# Patient Record
Sex: MALE | Race: WHITE | Employment: OTHER | URBAN - METROPOLITAN AREA
[De-identification: names, ages, dates, MRNs, and addresses within clinical notes are randomized per-mention and may not be internally consistent; named-entity substitution may affect disease eponyms.]

---

## 2017-05-08 ENCOUNTER — HOSPITAL ENCOUNTER (OUTPATIENT)
Dept: OTHER | Age: 67
Discharge: HOME | End: 2017-05-08
Attending: INTERNAL MEDICINE | Admitting: INTERNAL MEDICINE

## 2017-05-08 LAB
ANION GAP SERPL CALCULATED.3IONS-SCNC: 15 MMOL/L (ref 3–16)
APTT: 27.5 SEC (ref 21–31.8)
BASOPHILS ABSOLUTE: 0.2 K/UL (ref 0–0.2)
BASOPHILS RELATIVE PERCENT: 1.8 %
BILIRUBIN URINE: NEGATIVE
BLOOD, URINE: NEGATIVE
BUN BLDV-MCNC: 13 MG/DL (ref 7–20)
CALCIUM SERPL-MCNC: 9.3 MG/DL (ref 8.3–10.6)
CHLORIDE BLD-SCNC: 102 MMOL/L (ref 99–110)
CLARITY: CLEAR
CO2: 26 MMOL/L (ref 21–32)
COLOR: YELLOW
CREAT SERPL-MCNC: 0.9 MG/DL (ref 0.8–1.3)
EOSINOPHILS ABSOLUTE: 0.3 K/UL (ref 0–0.6)
EOSINOPHILS RELATIVE PERCENT: 2.8 %
GFR AFRICAN AMERICAN: >60
GFR NON-AFRICAN AMERICAN: >60
GLUCOSE BLD-MCNC: 85 MG/DL (ref 70–99)
GLUCOSE URINE: NEGATIVE MG/DL
HCT VFR BLD CALC: 44.7 % (ref 40.5–52.5)
HEMOGLOBIN: 14.8 G/DL (ref 13.5–17.5)
INR BLD: 1.04 (ref 0.85–1.15)
KETONES, URINE: NEGATIVE MG/DL
LEUKOCYTE ESTERASE, URINE: NEGATIVE
LYMPHOCYTES ABSOLUTE: 1.5 K/UL (ref 1–5.1)
LYMPHOCYTES RELATIVE PERCENT: 15.6 %
MCH RBC QN AUTO: 31.9 PG (ref 26–34)
MCHC RBC AUTO-ENTMCNC: 33.1 G/DL (ref 31–36)
MCV RBC AUTO: 96.5 FL (ref 80–100)
MICROSCOPIC EXAMINATION: NORMAL
MONOCYTES ABSOLUTE: 0.7 K/UL (ref 0–1.3)
MONOCYTES RELATIVE PERCENT: 7.7 %
NEUTROPHILS ABSOLUTE: 6.8 K/UL (ref 1.7–7.7)
NEUTROPHILS RELATIVE PERCENT: 72.1 %
NITRITE, URINE: NEGATIVE
PDW BLD-RTO: 13.5 % (ref 12.4–15.4)
PH UA: 6.5
PLATELET # BLD: 308 K/UL (ref 135–450)
PMV BLD AUTO: 8.4 FL (ref 5–10.5)
POTASSIUM SERPL-SCNC: 4.2 MMOL/L (ref 3.5–5.1)
PROTEIN UA: NEGATIVE MG/DL
PROTHROMBIN TIME: 11.7 SEC (ref 9.6–13)
RBC # BLD: 4.63 M/UL (ref 4.2–5.9)
SODIUM BLD-SCNC: 143 MMOL/L (ref 136–145)
SPECIFIC GRAVITY UA: 1.01
URINE TYPE: NORMAL
UROBILINOGEN, URINE: 0.2 E.U./DL
WBC # BLD: 9.4 K/UL (ref 4–11)

## 2020-01-16 ENCOUNTER — HOSPITAL ENCOUNTER (OUTPATIENT)
Age: 70
Discharge: HOME | End: 2020-01-16
Payer: MEDICARE

## 2020-01-16 LAB
ANION GAP SERPL CALCULATED.3IONS-SCNC: 14 MMOL/L (ref 3–16)
APTT: 29.9 SEC (ref 24.2–36.2)
BASOPHILS ABSOLUTE: 0.1 K/UL (ref 0–0.2)
BASOPHILS RELATIVE PERCENT: 0.7 %
BILIRUBIN URINE: NEGATIVE
BLOOD, URINE: NEGATIVE
BUN BLDV-MCNC: 16 MG/DL (ref 7–20)
CALCIUM SERPL-MCNC: 9.4 MG/DL (ref 8.3–10.6)
CHLORIDE BLD-SCNC: 102 MMOL/L (ref 99–110)
CLARITY: CLEAR
CO2: 27 MMOL/L (ref 21–32)
COLOR: YELLOW
CREAT SERPL-MCNC: 1 MG/DL (ref 0.8–1.3)
EOSINOPHILS ABSOLUTE: 0.2 K/UL (ref 0–0.6)
EOSINOPHILS RELATIVE PERCENT: 2.5 %
EPITHELIAL CELLS, UA: 0 /HPF (ref 0–5)
GFR AFRICAN AMERICAN: >60
GFR NON-AFRICAN AMERICAN: >60
GLUCOSE BLD-MCNC: 81 MG/DL (ref 70–99)
GLUCOSE URINE: NEGATIVE MG/DL
HCT VFR BLD CALC: 44.4 % (ref 40.5–52.5)
HEMOGLOBIN: 14.7 G/DL (ref 13.5–17.5)
HYALINE CASTS: 0 /LPF (ref 0–8)
INR BLD: 1.08 (ref 0.86–1.14)
KETONES, URINE: NEGATIVE MG/DL
LEUKOCYTE ESTERASE, URINE: NEGATIVE
LYMPHOCYTES ABSOLUTE: 1.5 K/UL (ref 1–5.1)
LYMPHOCYTES RELATIVE PERCENT: 18.9 %
MCH RBC QN AUTO: 31.9 PG (ref 26–34)
MCHC RBC AUTO-ENTMCNC: 33.1 G/DL (ref 31–36)
MCV RBC AUTO: 96.2 FL (ref 80–100)
MICROSCOPIC EXAMINATION: NORMAL
MONOCYTES ABSOLUTE: 0.8 K/UL (ref 0–1.3)
MONOCYTES RELATIVE PERCENT: 10.5 %
NEUTROPHILS ABSOLUTE: 5.4 K/UL (ref 1.7–7.7)
NEUTROPHILS RELATIVE PERCENT: 67.4 %
NITRITE, URINE: NEGATIVE
PDW BLD-RTO: 13.3 % (ref 12.4–15.4)
PH UA: 6 (ref 5–8)
PLATELET # BLD: 325 K/UL (ref 135–450)
PMV BLD AUTO: 8.3 FL (ref 5–10.5)
POTASSIUM SERPL-SCNC: 4.3 MMOL/L (ref 3.5–5.1)
PROTEIN UA: NEGATIVE MG/DL
PROTHROMBIN TIME: 12.5 SEC (ref 10–13.2)
RBC # BLD: 4.61 M/UL (ref 4.2–5.9)
RBC UA: 2 /HPF (ref 0–4)
SODIUM BLD-SCNC: 143 MMOL/L (ref 136–145)
SPECIFIC GRAVITY UA: 1.02 (ref 1–1.03)
URINE TYPE: NORMAL
UROBILINOGEN, URINE: 0.2 E.U./DL
WBC # BLD: 8 K/UL (ref 4–11)
WBC UA: 1 /HPF (ref 0–5)

## 2020-01-16 PROCEDURE — 36415 COLL VENOUS BLD VENIPUNCTURE: CPT

## 2020-01-16 PROCEDURE — 85730 THROMBOPLASTIN TIME PARTIAL: CPT

## 2020-01-16 PROCEDURE — 85610 PROTHROMBIN TIME: CPT

## 2020-01-16 PROCEDURE — 81001 URINALYSIS AUTO W/SCOPE: CPT

## 2020-01-16 PROCEDURE — 93005 ELECTROCARDIOGRAM TRACING: CPT | Performed by: INTERNAL MEDICINE

## 2020-01-16 PROCEDURE — 80048 BASIC METABOLIC PNL TOTAL CA: CPT

## 2020-01-16 PROCEDURE — 85025 COMPLETE CBC W/AUTO DIFF WBC: CPT

## 2020-01-17 LAB
EKG ATRIAL RATE: 59 BPM
EKG DIAGNOSIS: NORMAL
EKG P AXIS: 77 DEGREES
EKG P-R INTERVAL: 162 MS
EKG Q-T INTERVAL: 424 MS
EKG QRS DURATION: 82 MS
EKG QTC CALCULATION (BAZETT): 419 MS
EKG R AXIS: 61 DEGREES
EKG T AXIS: 11 DEGREES
EKG VENTRICULAR RATE: 59 BPM

## 2020-01-17 PROCEDURE — 93010 ELECTROCARDIOGRAM REPORT: CPT | Performed by: INTERNAL MEDICINE

## 2021-05-14 ENCOUNTER — HOSPITAL ENCOUNTER (OUTPATIENT)
Age: 71
Discharge: HOME | End: 2021-05-14
Payer: MEDICARE

## 2021-05-14 LAB
A/G RATIO: 1.6 (ref 1.1–2.2)
ALBUMIN SERPL-MCNC: 4.6 G/DL (ref 3.4–5)
ALP BLD-CCNC: 88 U/L (ref 40–129)
ALT SERPL-CCNC: 25 U/L (ref 10–40)
ANION GAP SERPL CALCULATED.3IONS-SCNC: 13 MMOL/L (ref 3–16)
AST SERPL-CCNC: 33 U/L (ref 15–37)
BASOPHILS ABSOLUTE: 0.1 K/UL (ref 0–0.2)
BASOPHILS RELATIVE PERCENT: 1.1 %
BILIRUB SERPL-MCNC: 0.8 MG/DL (ref 0–1)
BUN BLDV-MCNC: 16 MG/DL (ref 7–20)
CALCIUM SERPL-MCNC: 9.8 MG/DL (ref 8.3–10.6)
CHLORIDE BLD-SCNC: 107 MMOL/L (ref 99–110)
CO2: 24 MMOL/L (ref 21–32)
CREAT SERPL-MCNC: 1.3 MG/DL (ref 0.8–1.3)
EOSINOPHILS ABSOLUTE: 0.4 K/UL (ref 0–0.6)
EOSINOPHILS RELATIVE PERCENT: 4.5 %
GFR AFRICAN AMERICAN: >60
GFR NON-AFRICAN AMERICAN: 54
GLOBULIN: 2.9 G/DL
GLUCOSE BLD-MCNC: 69 MG/DL (ref 70–99)
HCT VFR BLD CALC: 42.3 % (ref 40.5–52.5)
HEMOGLOBIN: 14.4 G/DL (ref 13.5–17.5)
LYMPHOCYTES ABSOLUTE: 1.6 K/UL (ref 1–5.1)
LYMPHOCYTES RELATIVE PERCENT: 20.5 %
MCH RBC QN AUTO: 32.2 PG (ref 26–34)
MCHC RBC AUTO-ENTMCNC: 33.9 G/DL (ref 31–36)
MCV RBC AUTO: 94.9 FL (ref 80–100)
MONOCYTES ABSOLUTE: 0.9 K/UL (ref 0–1.3)
MONOCYTES RELATIVE PERCENT: 11.4 %
NEUTROPHILS ABSOLUTE: 5 K/UL (ref 1.7–7.7)
NEUTROPHILS RELATIVE PERCENT: 62.5 %
PDW BLD-RTO: 13.9 % (ref 12.4–15.4)
PLATELET # BLD: 327 K/UL (ref 135–450)
PMV BLD AUTO: 7.6 FL (ref 5–10.5)
POTASSIUM SERPL-SCNC: 5.1 MMOL/L (ref 3.5–5.1)
RBC # BLD: 4.46 M/UL (ref 4.2–5.9)
SODIUM BLD-SCNC: 144 MMOL/L (ref 136–145)
TOTAL PROTEIN: 7.5 G/DL (ref 6.4–8.2)
WBC # BLD: 7.9 K/UL (ref 4–11)

## 2021-05-14 PROCEDURE — 85025 COMPLETE CBC W/AUTO DIFF WBC: CPT

## 2021-05-14 PROCEDURE — 36415 COLL VENOUS BLD VENIPUNCTURE: CPT

## 2021-05-14 PROCEDURE — 83036 HEMOGLOBIN GLYCOSYLATED A1C: CPT

## 2021-05-14 PROCEDURE — 80053 COMPREHEN METABOLIC PANEL: CPT

## 2021-05-15 LAB
ESTIMATED AVERAGE GLUCOSE: 99.7 MG/DL
HBA1C MFR BLD: 5.1 %

## 2021-07-15 ENCOUNTER — CLINICAL DOCUMENTATION (OUTPATIENT)
Dept: OTHER | Age: 71
End: 2021-07-15

## 2024-07-05 ENCOUNTER — APPOINTMENT (OUTPATIENT)
Dept: GENERAL RADIOLOGY | Age: 74
End: 2024-07-05
Payer: MEDICARE

## 2024-07-05 ENCOUNTER — HOSPITAL ENCOUNTER (OUTPATIENT)
Age: 74
Setting detail: OBSERVATION
Discharge: HOME | End: 2024-07-06
Attending: STUDENT IN AN ORGANIZED HEALTH CARE EDUCATION/TRAINING PROGRAM | Admitting: STUDENT IN AN ORGANIZED HEALTH CARE EDUCATION/TRAINING PROGRAM
Payer: MEDICARE

## 2024-07-05 DIAGNOSIS — I20.9 ANGINA PECTORIS (HCC): ICD-10-CM

## 2024-07-05 DIAGNOSIS — R07.9 CHEST PAIN, UNSPECIFIED TYPE: ICD-10-CM

## 2024-07-05 DIAGNOSIS — R94.31 ABNORMAL ELECTROCARDIOGRAPHY: ICD-10-CM

## 2024-07-05 DIAGNOSIS — R07.89 CHEST DISCOMFORT: Primary | ICD-10-CM

## 2024-07-05 LAB
ALBUMIN SERPL-MCNC: 4.4 G/DL (ref 3.4–5)
ALBUMIN/GLOB SERPL: 1.3 {RATIO} (ref 1.1–2.2)
ALP SERPL-CCNC: 111 U/L (ref 40–129)
ALT SERPL-CCNC: 23 U/L (ref 10–40)
ANION GAP SERPL CALCULATED.3IONS-SCNC: 11 MMOL/L (ref 3–16)
AST SERPL-CCNC: 31 U/L (ref 15–37)
BASOPHILS # BLD: 0 K/UL (ref 0–0.2)
BASOPHILS NFR BLD: 0.4 %
BILIRUB SERPL-MCNC: 0.7 MG/DL (ref 0–1)
BUN SERPL-MCNC: 13 MG/DL (ref 7–20)
CALCIUM SERPL-MCNC: 9.1 MG/DL (ref 8.3–10.6)
CHLORIDE SERPL-SCNC: 102 MMOL/L (ref 99–110)
CO2 SERPL-SCNC: 25 MMOL/L (ref 21–32)
CREAT SERPL-MCNC: 1.1 MG/DL (ref 0.8–1.3)
D-DIMER QUANTITATIVE: 0.6 UG/ML FEU (ref 0–0.6)
DEPRECATED RDW RBC AUTO: 13.5 % (ref 12.4–15.4)
EKG ATRIAL RATE: 70 BPM
EKG DIAGNOSIS: NORMAL
EKG P AXIS: 71 DEGREES
EKG P-R INTERVAL: 154 MS
EKG Q-T INTERVAL: 394 MS
EKG QRS DURATION: 78 MS
EKG QTC CALCULATION (BAZETT): 425 MS
EKG R AXIS: 51 DEGREES
EKG T AXIS: 0 DEGREES
EKG VENTRICULAR RATE: 70 BPM
EOSINOPHIL # BLD: 0 K/UL (ref 0–0.6)
EOSINOPHIL NFR BLD: 0.5 %
GFR SERPLBLD CREATININE-BSD FMLA CKD-EPI: 70 ML/MIN/{1.73_M2}
GLUCOSE SERPL-MCNC: 111 MG/DL (ref 70–99)
HCT VFR BLD AUTO: 43.6 % (ref 40.5–52.5)
HGB BLD-MCNC: 14.8 G/DL (ref 13.5–17.5)
LYMPHOCYTES # BLD: 0.6 K/UL (ref 1–5.1)
LYMPHOCYTES NFR BLD: 9.4 %
MAGNESIUM SERPL-MCNC: 1.9 MG/DL (ref 1.8–2.4)
MCH RBC QN AUTO: 31.9 PG (ref 26–34)
MCHC RBC AUTO-ENTMCNC: 33.9 G/DL (ref 31–36)
MCV RBC AUTO: 94 FL (ref 80–100)
MONOCYTES # BLD: 1 K/UL (ref 0–1.3)
MONOCYTES NFR BLD: 14.6 %
NEUTROPHILS # BLD: 5.1 K/UL (ref 1.7–7.7)
NEUTROPHILS NFR BLD: 75.1 %
NT-PROBNP SERPL-MCNC: 284 PG/ML (ref 0–449)
PLATELET # BLD AUTO: 274 K/UL (ref 135–450)
PMV BLD AUTO: 7.3 FL (ref 5–10.5)
POTASSIUM SERPL-SCNC: 4.4 MMOL/L (ref 3.5–5.1)
PROT SERPL-MCNC: 7.8 G/DL (ref 6.4–8.2)
RBC # BLD AUTO: 4.64 M/UL (ref 4.2–5.9)
SODIUM SERPL-SCNC: 138 MMOL/L (ref 136–145)
TROPONIN, HIGH SENSITIVITY: 19 NG/L (ref 0–22)
TROPONIN, HIGH SENSITIVITY: 19 NG/L (ref 0–22)
TSH SERPL DL<=0.005 MIU/L-ACNC: 1.98 UIU/ML (ref 0.27–4.2)
WBC # BLD AUTO: 6.8 K/UL (ref 4–11)

## 2024-07-05 PROCEDURE — 80053 COMPREHEN METABOLIC PANEL: CPT

## 2024-07-05 PROCEDURE — 6370000000 HC RX 637 (ALT 250 FOR IP): Performed by: STUDENT IN AN ORGANIZED HEALTH CARE EDUCATION/TRAINING PROGRAM

## 2024-07-05 PROCEDURE — G0378 HOSPITAL OBSERVATION PER HR: HCPCS

## 2024-07-05 PROCEDURE — 6370000000 HC RX 637 (ALT 250 FOR IP): Performed by: INTERNAL MEDICINE

## 2024-07-05 PROCEDURE — 85379 FIBRIN DEGRADATION QUANT: CPT

## 2024-07-05 PROCEDURE — 99285 EMERGENCY DEPT VISIT HI MDM: CPT

## 2024-07-05 PROCEDURE — 85025 COMPLETE CBC W/AUTO DIFF WBC: CPT

## 2024-07-05 PROCEDURE — 71045 X-RAY EXAM CHEST 1 VIEW: CPT

## 2024-07-05 PROCEDURE — 83735 ASSAY OF MAGNESIUM: CPT

## 2024-07-05 PROCEDURE — 93010 ELECTROCARDIOGRAM REPORT: CPT | Performed by: INTERNAL MEDICINE

## 2024-07-05 PROCEDURE — 84484 ASSAY OF TROPONIN QUANT: CPT

## 2024-07-05 PROCEDURE — 6370000000 HC RX 637 (ALT 250 FOR IP): Performed by: PHYSICIAN ASSISTANT

## 2024-07-05 PROCEDURE — 99223 1ST HOSP IP/OBS HIGH 75: CPT | Performed by: INTERNAL MEDICINE

## 2024-07-05 PROCEDURE — 93005 ELECTROCARDIOGRAM TRACING: CPT | Performed by: STUDENT IN AN ORGANIZED HEALTH CARE EDUCATION/TRAINING PROGRAM

## 2024-07-05 PROCEDURE — 84443 ASSAY THYROID STIM HORMONE: CPT

## 2024-07-05 PROCEDURE — 83880 ASSAY OF NATRIURETIC PEPTIDE: CPT

## 2024-07-05 PROCEDURE — 2580000003 HC RX 258: Performed by: STUDENT IN AN ORGANIZED HEALTH CARE EDUCATION/TRAINING PROGRAM

## 2024-07-05 RX ORDER — MAGNESIUM SULFATE IN WATER 40 MG/ML
2000 INJECTION, SOLUTION INTRAVENOUS PRN
Status: DISCONTINUED | OUTPATIENT
Start: 2024-07-05 | End: 2024-07-06 | Stop reason: HOSPADM

## 2024-07-05 RX ORDER — DILTIAZEM HYDROCHLORIDE 120 MG/1
120 CAPSULE, COATED, EXTENDED RELEASE ORAL DAILY
Status: DISCONTINUED | OUTPATIENT
Start: 2024-07-05 | End: 2024-07-06 | Stop reason: HOSPADM

## 2024-07-05 RX ORDER — SODIUM CHLORIDE 0.9 % (FLUSH) 0.9 %
5-40 SYRINGE (ML) INJECTION EVERY 12 HOURS SCHEDULED
Status: DISCONTINUED | OUTPATIENT
Start: 2024-07-05 | End: 2024-07-06 | Stop reason: HOSPADM

## 2024-07-05 RX ORDER — POTASSIUM CHLORIDE 7.45 MG/ML
10 INJECTION INTRAVENOUS PRN
Status: DISCONTINUED | OUTPATIENT
Start: 2024-07-05 | End: 2024-07-06 | Stop reason: HOSPADM

## 2024-07-05 RX ORDER — SODIUM CHLORIDE 0.9 % (FLUSH) 0.9 %
5-40 SYRINGE (ML) INJECTION PRN
Status: DISCONTINUED | OUTPATIENT
Start: 2024-07-05 | End: 2024-07-06 | Stop reason: HOSPADM

## 2024-07-05 RX ORDER — REGADENOSON 0.08 MG/ML
0.4 INJECTION, SOLUTION INTRAVENOUS
Status: CANCELLED | OUTPATIENT
Start: 2024-07-05

## 2024-07-05 RX ORDER — ACETAMINOPHEN 325 MG/1
650 TABLET ORAL EVERY 6 HOURS PRN
Status: DISCONTINUED | OUTPATIENT
Start: 2024-07-05 | End: 2024-07-06 | Stop reason: HOSPADM

## 2024-07-05 RX ORDER — ASPIRIN 81 MG/1
324 TABLET, CHEWABLE ORAL ONCE
Status: COMPLETED | OUTPATIENT
Start: 2024-07-05 | End: 2024-07-05

## 2024-07-05 RX ORDER — ACETAMINOPHEN 650 MG/1
650 SUPPOSITORY RECTAL EVERY 6 HOURS PRN
Status: DISCONTINUED | OUTPATIENT
Start: 2024-07-05 | End: 2024-07-06 | Stop reason: HOSPADM

## 2024-07-05 RX ORDER — SODIUM CHLORIDE 9 MG/ML
INJECTION, SOLUTION INTRAVENOUS PRN
Status: DISCONTINUED | OUTPATIENT
Start: 2024-07-05 | End: 2024-07-06 | Stop reason: HOSPADM

## 2024-07-05 RX ORDER — ONDANSETRON 2 MG/ML
4 INJECTION INTRAMUSCULAR; INTRAVENOUS EVERY 6 HOURS PRN
Status: DISCONTINUED | OUTPATIENT
Start: 2024-07-05 | End: 2024-07-06 | Stop reason: HOSPADM

## 2024-07-05 RX ORDER — POTASSIUM CHLORIDE 20 MEQ/1
40 TABLET, EXTENDED RELEASE ORAL PRN
Status: DISCONTINUED | OUTPATIENT
Start: 2024-07-05 | End: 2024-07-06 | Stop reason: HOSPADM

## 2024-07-05 RX ORDER — DILTIAZEM HYDROCHLORIDE 120 MG/1
120 CAPSULE, COATED, EXTENDED RELEASE ORAL DAILY
Status: DISCONTINUED | OUTPATIENT
Start: 2024-07-05 | End: 2024-07-05

## 2024-07-05 RX ORDER — POLYETHYLENE GLYCOL 3350 17 G/17G
17 POWDER, FOR SOLUTION ORAL DAILY PRN
Status: DISCONTINUED | OUTPATIENT
Start: 2024-07-05 | End: 2024-07-06 | Stop reason: HOSPADM

## 2024-07-05 RX ORDER — ENOXAPARIN SODIUM 100 MG/ML
40 INJECTION SUBCUTANEOUS DAILY
Status: DISCONTINUED | OUTPATIENT
Start: 2024-07-05 | End: 2024-07-06 | Stop reason: HOSPADM

## 2024-07-05 RX ORDER — ONDANSETRON 4 MG/1
4 TABLET, ORALLY DISINTEGRATING ORAL EVERY 8 HOURS PRN
Status: DISCONTINUED | OUTPATIENT
Start: 2024-07-05 | End: 2024-07-06 | Stop reason: HOSPADM

## 2024-07-05 RX ORDER — LANOLIN ALCOHOL/MO/W.PET/CERES
6 CREAM (GRAM) TOPICAL NIGHTLY PRN
Status: DISCONTINUED | OUTPATIENT
Start: 2024-07-05 | End: 2024-07-06 | Stop reason: HOSPADM

## 2024-07-05 RX ORDER — NITROGLYCERIN 0.4 MG/1
0.4 TABLET SUBLINGUAL EVERY 5 MIN PRN
Status: DISCONTINUED | OUTPATIENT
Start: 2024-07-05 | End: 2024-07-06 | Stop reason: HOSPADM

## 2024-07-05 RX ADMIN — ASPIRIN 81 MG 324 MG: 81 TABLET ORAL at 11:00

## 2024-07-05 RX ADMIN — DILTIAZEM HYDROCHLORIDE 120 MG: 120 CAPSULE, EXTENDED RELEASE ORAL at 17:32

## 2024-07-05 RX ADMIN — SODIUM CHLORIDE, PRESERVATIVE FREE 10 ML: 5 INJECTION INTRAVENOUS at 20:59

## 2024-07-05 RX ADMIN — ACETAMINOPHEN 650 MG: 325 TABLET ORAL at 20:58

## 2024-07-05 RX ADMIN — NITROGLYCERIN 0.5 INCH: 20 OINTMENT TOPICAL at 11:01

## 2024-07-05 ASSESSMENT — ENCOUNTER SYMPTOMS
DIARRHEA: 0
ABDOMINAL PAIN: 0
BACK PAIN: 0
COLOR CHANGE: 0
NAUSEA: 1
CONSTIPATION: 0
SHORTNESS OF BREATH: 0
CHEST TIGHTNESS: 1
VOMITING: 0
COUGH: 0

## 2024-07-05 ASSESSMENT — PAIN DESCRIPTION - LOCATION
LOCATION: GENERALIZED;HEAD
LOCATION: CHEST
LOCATION: GENERALIZED;HEAD
LOCATION: CHEST

## 2024-07-05 ASSESSMENT — PAIN SCALES - GENERAL
PAINLEVEL_OUTOF10: 5
PAINLEVEL_OUTOF10: 1
PAINLEVEL_OUTOF10: 1
PAINLEVEL_OUTOF10: 5
PAINLEVEL_OUTOF10: 0
PAINLEVEL_OUTOF10: 1

## 2024-07-05 ASSESSMENT — PAIN DESCRIPTION - ORIENTATION: ORIENTATION: RIGHT;LEFT;ANTERIOR

## 2024-07-05 ASSESSMENT — HEART SCORE: ECG: NON-SPECIFC REPOLARIZATION DISTURBANCE/LBTB/PM

## 2024-07-05 ASSESSMENT — PAIN DESCRIPTION - DESCRIPTORS
DESCRIPTORS: ACHING
DESCRIPTORS: ACHING
DESCRIPTORS: SQUEEZING

## 2024-07-05 ASSESSMENT — LIFESTYLE VARIABLES
HOW OFTEN DO YOU HAVE A DRINK CONTAINING ALCOHOL: NEVER
HOW MANY STANDARD DRINKS CONTAINING ALCOHOL DO YOU HAVE ON A TYPICAL DAY: PATIENT DOES NOT DRINK

## 2024-07-05 ASSESSMENT — PAIN - FUNCTIONAL ASSESSMENT: PAIN_FUNCTIONAL_ASSESSMENT: 0-10

## 2024-07-05 NOTE — CONSULTS
[facility], [facility]   Electrophysiology   Date: 7/5/2024  Reason for Consultation: Tachycardia   Consult Requesting Physician: Campos Pablo MD     Chief Complaint   Patient presents with    Chest Pain     Patient arrives through triage with complaints of chest pain. Substernal chest pain that radiates to left side and into the shoulder. Patient denies hx of heart problems. Patient reports nausea and feeling unsteady on his feet as well. Unsteadiness has been ongoing for weeks.        CC: Chest pain  HPI: Jefe Loomis is a 74 y.o. male with past medical history arthritis, bilateral hip and knee replacement, hernia repair     He has no prior history of cardiac issues. States that he started noticing intermittent chest pain for the past three weeks.   Patient complained of left-sided chest pain that radiates into his left shoulder he states ongoing for the past several days.  Patient describes pain as a aching discomfort.  No shortness of breath. Denies aggravating or relieving factors.  Denies palpitations, lightheadedness, dizziness, cough, sick contacts, change in urinary or bowel habits and/or recent travel.  Denies current tobacco use, prior smoker in 1981. Does report family history of heart disease. States he is active at home, lift weights regularly.   He has arthritis and has had multiple knee and hip surgery.     On telemetry he was noted to have runs of non-sustained SVT and EP has been consulted.     Assessment:   Chest discomfort   Paroxysmal atrial tachycardia     Plan:   Patient has had runs of non-sustained narrow complex tachycardia, likely PAT.   It is not clear whether his chest pain are related to these episodes.   He hasn't seen a physician for years.     Discussed diagnostic options including stress testing. Risks, benefits and alternative were explained. All questions answered. Patient understood and would like to proceed.      Echocardiogram  Stress test to r/o ischemic

## 2024-07-05 NOTE — ED PROVIDER NOTES
[facility] EMERGENCY DEPARTMENT  EMERGENCY DEPARTMENT ENCOUNTER        Pt Name: Jefe Loomis  MRN: [id number]  Birthdate 1950  Date of evaluation: 7/5/2024  Provider: ZORAN Suresh  PCP: Esteban Emerson MD  Note Started: 1:10 PM EDT 7/5/24      BILL. I have evaluated this patient.        CHIEF COMPLAINT       Chief Complaint   Patient presents with    Chest Pain     Patient arrives through triage with complaints of chest pain. Substernal chest pain that radiates to left side and into the shoulder. Patient denies hx of heart problems. Patient reports nausea and feeling unsteady on his feet as well. Unsteadiness has been ongoing for weeks.        HISTORY OF PRESENT ILLNESS: 1 or more Elements     History From: Patient  Limitations to history : None    Jefe Loomis is a 74 y.o. male with no significant past medical history who presents ED with complaint of chest pain.  Patient complained of left-sided chest pain that radiates into his left shoulder he states ongoing for the past several days.  Patient describes pain as a aching discomfort rated 1/10.  Denies aggravating relieving factors.  He denies shortness of breath but states pain does endorse when he takes a deep breath.  Denies cough or hemoptysis.  Denies headache, lightheadedness/dizziness, syncope or near syncope.  Denies diaphoresis.  Reports some nausea.  Denies abdominal pain, vomiting, urinary symptoms or changes in bowel movements.  Denies history of heart disease.  Denies recent travel, trips, surgery or immobilization.  He denies history of DVT or PE.  Has never had cardiac stress test or catheterization in the past.  He states he is not a smoker.  Does report family history of heart disease.    Nursing Notes were all reviewed and agreed with or any disagreements were addressed in the HPI.    REVIEW OF SYSTEMS :      Review of Systems   Constitutional:  Negative for activity change, appetite change, chills,

## 2024-07-05 NOTE — ED NOTES
ED TO INPATIENT SBAR HANDOFF    Patient Name: Jefe Loomis   :  1950  74 y.o.   MRN:  [id number]  Preferred Name  Jefe  ED Room #:  ED-0024/24  Family/Caregiver Present yes   Restraints no   Sitter no   Sepsis Risk Score      Situation  Code Status: No Order No additional code details.    Allergies: Patient has no known allergies.  Weight: Patient Vitals for the past 96 hrs (Last 3 readings):   Weight   24 0949 87.5 kg (193 lb)     Arrived from: home  Chief Complaint:   Chief Complaint   Patient presents with    Chest Pain     Patient arrives through triage with complaints of chest pain. Substernal chest pain that radiates to left side and into the shoulder. Patient denies hx of heart problems. Patient reports nausea and feeling unsteady on his feet as well. Unsteadiness has been ongoing for weeks.      Hospital Problem/Diagnosis:  Active Problems:    * No active hospital problems. *  Resolved Problems:    * No resolved hospital problems. *    Imaging:   XR CHEST PORTABLE   Final Result   1.  No acute abnormality.           Abnormal labs:   Abnormal Labs Reviewed   CBC WITH AUTO DIFFERENTIAL - Abnormal; Notable for the following components:       Result Value    Lymphocytes Absolute 0.6 (*)     All other components within normal limits   COMPREHENSIVE METABOLIC PANEL W/ REFLEX TO MG FOR LOW K - Abnormal; Notable for the following components:    Glucose 111 (*)     All other components within normal limits     Critical values: no     Abnormal Assessment Findings: Chest pain with normal trop.    Background  History: No past medical history on file.    Assessment    Vitals/MEWS: MEWS Score: 1  Level of Consciousness: Alert (0)   Vitals:    24 1215 24 1230 24 1245 24 1300   BP: 103/61 118/84 129/82 122/74   Pulse: 84 69 93 74   Resp: 20 18 11 15   Temp:       TempSrc:       SpO2: 94% 93% 95% 93%   Weight:       Height:         FiO2 (%): none.  O2 Flow Rate:      Cardiac Rhythm:

## 2024-07-05 NOTE — ED NOTES
How does patient ambulate?   [x]Low Fall Risk (ambulates by themselves without support)  []Stand by assist   []Contact Guard   []Front wheel walker  []Wheelchair   []Steady  []Bed bound  []History of Lower Extremity Amputation  []Unknown, did not assess in the emergency department   How does patient take pills?  [x]Whole with Water  []Crushed in applesauce  []Crushed in pudding  []Other  []Unknown no oral medications were given in the ED  Is patient alert?   [x]Alert  []Drowsy but responds to voice  []Doesn't respond to voice but responds to painful stimuli  []Unresponsive  Is patient oriented?   [x]To person  [x]To place  [x]To time  [x]To situation  []Confused  []Agitated  [x]Follows commands  If patient is disoriented or from a Skill Nursing Facility has family been notified of admission?   [x]Yes   []No  Patient belongings?   [x]Cell phone  [x]Wallet   []Dentures  [x]Clothing  Any specific patient or family belongings/needs/dynamics?   None.  Miscellaneous comments/pending orders?  None.     If there are any additional questions please reach out to the Emergency Department.

## 2024-07-05 NOTE — ED NOTES
Attending hospitalist notified that Pt had 2 rounds of atrial tachycardia for around 25 beats.  Pt placed on the defibrillator pads as a precaution before transfer to the floor.

## 2024-07-05 NOTE — PROGRESS NOTES
Patient has arrived to unit. Vitals stable. Patient is awake, alert and oriented. Respirations are easy and unlabored. Patient does not appear to be in distress. Patient oriented to room and call light. Plan of care discussed with patient, patient agreeable. Call light within reach. Patient noted in the ED to have some irregular HR, cardiology consulted

## 2024-07-05 NOTE — H&P
HOSPITALISTS HISTORY AND PHYSICAL    2024 1:44 PM    Patient Information:  SHELLEY BLAKE is a 74 y.o. male [id number]  PCP:  Esteban Emerson MD (Tel: None )    Chief complaint:    Chief Complaint   Patient presents with    Chest Pain     Patient arrives through triage with complaints of chest pain. Substernal chest pain that radiates to left side and into the shoulder. Patient denies hx of heart problems. Patient reports nausea and feeling unsteady on his feet as well. Unsteadiness has been ongoing for weeks.     cp    History of Present Illness:  Shelley Blake is a 74 y.o. male  w no sig PMH who presented with lt chest pain. Describes as  lower chest  ,close to nipple area  , sharp twinges, lasting few seconds. Not a.w activity, actually doesn't feel which while active but more at rest.  Been going on for 2-3 months. No n/v , diaphoresis with episode.  Mother had cad and  in 80;s. Stopped smoking 40 yrs ago.     Vitals wnl  Labs d dimer , trop x2 wnl.  EKG TWI inf leads, similar to prior  Cxr no abnormality         REVIEW OF SYSTEMS:   All 10 systems reviewed and  are negative except as mentioned in HPI    Past Medical History:   has no past medical history on file.     Past Surgical History:   has no past surgical history on file.     Medications:  No current facility-administered medications on file prior to encounter.     No current outpatient medications on file prior to encounter.       Allergies:  No Known Allergies     Social History:  Patient Lives    reports that he has never smoked. He has never used smokeless tobacco. He reports that he does not drink alcohol and does not use drugs.     Family History:  family history is not on file. ,   Physical Exam:  /81   Pulse 69   Temp 98.3 °F (36.8 °C) (Oral)   Resp 10   Ht 1.803 m (5' 11\")   Wt 87.5 kg (193 lb)   SpO2 94%   BMI

## 2024-07-05 NOTE — CARE COORDINATION
SW reviewed pt's chart for discharge planning purposes.  Patient is from home w/spouse.  He is active with insurance and PCP.   Pt being admitted under observation anticipating a short stay at the hospital.  No discharge needs have been identified at this time.  SW will continue to follow during admission.

## 2024-07-06 ENCOUNTER — HOSPITAL ENCOUNTER (OUTPATIENT)
Age: 74
Setting detail: OBSERVATION
End: 2024-07-06
Payer: MEDICARE

## 2024-07-06 ENCOUNTER — APPOINTMENT (OUTPATIENT)
Age: 74
End: 2024-07-06
Payer: MEDICARE

## 2024-07-06 VITALS
BODY MASS INDEX: 26.14 KG/M2 | DIASTOLIC BLOOD PRESSURE: 77 MMHG | RESPIRATION RATE: 16 BRPM | SYSTOLIC BLOOD PRESSURE: 123 MMHG | HEART RATE: 64 BPM | HEIGHT: 72 IN | WEIGHT: 193 LBS

## 2024-07-06 VITALS
BODY MASS INDEX: 27.02 KG/M2 | OXYGEN SATURATION: 98 % | WEIGHT: 193 LBS | TEMPERATURE: 98.2 F | SYSTOLIC BLOOD PRESSURE: 113 MMHG | DIASTOLIC BLOOD PRESSURE: 64 MMHG | HEIGHT: 71 IN | RESPIRATION RATE: 16 BRPM | HEART RATE: 73 BPM

## 2024-07-06 LAB
ALBUMIN SERPL-MCNC: 4.3 G/DL (ref 3.4–5)
ANION GAP SERPL CALCULATED.3IONS-SCNC: 13 MMOL/L (ref 3–16)
BASOPHILS # BLD: 0.1 K/UL (ref 0–0.2)
BASOPHILS NFR BLD: 0.8 %
BUN SERPL-MCNC: 14 MG/DL (ref 7–20)
CALCIUM SERPL-MCNC: 9.1 MG/DL (ref 8.3–10.6)
CHLORIDE SERPL-SCNC: 100 MMOL/L (ref 99–110)
CO2 SERPL-SCNC: 24 MMOL/L (ref 21–32)
CREAT SERPL-MCNC: 1 MG/DL (ref 0.8–1.3)
DEPRECATED RDW RBC AUTO: 13.4 % (ref 12.4–15.4)
ECHO BSA: 2.11 M2
EKG ATRIAL RATE: 67 BPM
EKG DIAGNOSIS: NORMAL
EKG P AXIS: 80 DEGREES
EKG P-R INTERVAL: 158 MS
EKG Q-T INTERVAL: 398 MS
EKG QRS DURATION: 80 MS
EKG QTC CALCULATION (BAZETT): 420 MS
EKG R AXIS: 46 DEGREES
EKG T AXIS: -3 DEGREES
EKG VENTRICULAR RATE: 67 BPM
EOSINOPHIL # BLD: 0.1 K/UL (ref 0–0.6)
EOSINOPHIL NFR BLD: 0.8 %
GFR SERPLBLD CREATININE-BSD FMLA CKD-EPI: 79 ML/MIN/{1.73_M2}
GLUCOSE SERPL-MCNC: 90 MG/DL (ref 70–99)
HCT VFR BLD AUTO: 44.9 % (ref 40.5–52.5)
HGB BLD-MCNC: 15 G/DL (ref 13.5–17.5)
LYMPHOCYTES # BLD: 0.9 K/UL (ref 1–5.1)
LYMPHOCYTES NFR BLD: 13.8 %
MAGNESIUM SERPL-MCNC: 2.1 MG/DL (ref 1.8–2.4)
MCH RBC QN AUTO: 31.2 PG (ref 26–34)
MCHC RBC AUTO-ENTMCNC: 33.3 G/DL (ref 31–36)
MCV RBC AUTO: 93.7 FL (ref 80–100)
MONOCYTES # BLD: 1.2 K/UL (ref 0–1.3)
MONOCYTES NFR BLD: 18.5 %
NEUTROPHILS # BLD: 4.4 K/UL (ref 1.7–7.7)
NEUTROPHILS NFR BLD: 66.1 %
NUC STRESS EJECTION FRACTION: 55 %
NUC STRESS LV EDV: 119 ML (ref 67–155)
NUC STRESS LV ESV: 54 ML (ref 22–58)
NUC STRESS LV MASS: 146 G
PHOSPHATE SERPL-MCNC: 2.6 MG/DL (ref 2.5–4.9)
PLATELET # BLD AUTO: 281 K/UL (ref 135–450)
PMV BLD AUTO: 7.3 FL (ref 5–10.5)
POTASSIUM SERPL-SCNC: 4 MMOL/L (ref 3.5–5.1)
RBC # BLD AUTO: 4.8 M/UL (ref 4.2–5.9)
SODIUM SERPL-SCNC: 137 MMOL/L (ref 136–145)
STRESS BASELINE DIAS BP: 77 MMHG
STRESS BASELINE HR: 64 BPM
STRESS BASELINE SYS BP: 123 MMHG
STRESS ESTIMATED WORKLOAD: 1 METS
STRESS EXERCISE DUR MIN: 4 MIN
STRESS EXERCISE DUR SEC: 0 SEC
STRESS O2 SAT PEAK: 97 %
STRESS O2 SAT REST: 98 %
STRESS PEAK DIAS BP: 68 MMHG
STRESS PEAK SYS BP: 107 MMHG
STRESS PERCENT HR ACHIEVED: 60 %
STRESS POST PEAK HR: 88 BPM
STRESS RATE PRESSURE PRODUCT: 9416 BPM*MMHG
STRESS TARGET HR: 146 BPM
TID: 1.02
WBC # BLD AUTO: 6.7 K/UL (ref 4–11)

## 2024-07-06 PROCEDURE — 6370000000 HC RX 637 (ALT 250 FOR IP): Performed by: INTERNAL MEDICINE

## 2024-07-06 PROCEDURE — 6360000002 HC RX W HCPCS

## 2024-07-06 PROCEDURE — 3430000000 HC RX DIAGNOSTIC RADIOPHARMACEUTICAL

## 2024-07-06 PROCEDURE — G0378 HOSPITAL OBSERVATION PER HR: HCPCS

## 2024-07-06 PROCEDURE — 93010 ELECTROCARDIOGRAM REPORT: CPT | Performed by: INTERNAL MEDICINE

## 2024-07-06 PROCEDURE — 85025 COMPLETE CBC W/AUTO DIFF WBC: CPT

## 2024-07-06 PROCEDURE — 93016 CV STRESS TEST SUPVJ ONLY: CPT | Performed by: INTERNAL MEDICINE

## 2024-07-06 PROCEDURE — 80069 RENAL FUNCTION PANEL: CPT

## 2024-07-06 PROCEDURE — 2580000003 HC RX 258: Performed by: STUDENT IN AN ORGANIZED HEALTH CARE EDUCATION/TRAINING PROGRAM

## 2024-07-06 PROCEDURE — 36415 COLL VENOUS BLD VENIPUNCTURE: CPT

## 2024-07-06 PROCEDURE — 78452 HT MUSCLE IMAGE SPECT MULT: CPT

## 2024-07-06 PROCEDURE — 93018 CV STRESS TEST I&R ONLY: CPT | Performed by: INTERNAL MEDICINE

## 2024-07-06 PROCEDURE — 83735 ASSAY OF MAGNESIUM: CPT

## 2024-07-06 PROCEDURE — A9502 TC99M TETROFOSMIN: HCPCS

## 2024-07-06 PROCEDURE — 78452 HT MUSCLE IMAGE SPECT MULT: CPT | Performed by: INTERNAL MEDICINE

## 2024-07-06 PROCEDURE — 93017 CV STRESS TEST TRACING ONLY: CPT

## 2024-07-06 RX ORDER — DILTIAZEM HYDROCHLORIDE 120 MG/1
120 CAPSULE, COATED, EXTENDED RELEASE ORAL DAILY
Qty: 30 CAPSULE | Refills: 1 | Status: SHIPPED | OUTPATIENT
Start: 2024-07-07

## 2024-07-06 RX ORDER — REGADENOSON 0.08 MG/ML
0.4 INJECTION, SOLUTION INTRAVENOUS
Status: COMPLETED | OUTPATIENT
Start: 2024-07-06 | End: 2024-07-06

## 2024-07-06 RX ADMIN — TETROFOSMIN 29 MILLICURIE: 1.38 INJECTION, POWDER, LYOPHILIZED, FOR SOLUTION INTRAVENOUS at 08:40

## 2024-07-06 RX ADMIN — SODIUM CHLORIDE, PRESERVATIVE FREE 10 ML: 5 INJECTION INTRAVENOUS at 10:13

## 2024-07-06 RX ADMIN — DILTIAZEM HYDROCHLORIDE 120 MG: 120 CAPSULE, EXTENDED RELEASE ORAL at 10:12

## 2024-07-06 RX ADMIN — TETROFOSMIN 11 MILLICURIE: 1.38 INJECTION, POWDER, LYOPHILIZED, FOR SOLUTION INTRAVENOUS at 07:54

## 2024-07-06 RX ADMIN — REGADENOSON 0.4 MG: 0.08 INJECTION, SOLUTION INTRAVENOUS at 08:34

## 2024-07-06 ASSESSMENT — PAIN SCALES - GENERAL
PAINLEVEL_OUTOF10: 0
PAINLEVEL_OUTOF10: 0

## 2024-07-06 NOTE — CARE COORDINATION
Case Management Note:    Patient admitted as Observation with an anticipated short hospitalization length of stay. Chart reviewed and it appears that patient has minimal needs for discharge at this time. Medical staff to inform case management team if discharge needs are identified.      Case management will continue to follow progress and update discharge plan as needed.      Electronically signed by ROSIO Elkins on 7/6/2024 at 8:24 AM

## 2024-07-06 NOTE — DISCHARGE INSTRUCTIONS
Follow up with a PCP in 1-2 weeks.  Follow up with a cardiologist for an ECHO.   Salvador Best MD, MPH  [facility] [place]   Office: [phone]  Fax: [phone]   
NA

## 2024-07-06 NOTE — DISCHARGE SUMMARY
[facility] DISCHARGE SUMMARY    Patient Demographics    Patient. Jefe Loomis  Date of Birth. 1950  MRN. [id number]     Primary care provider. Esteban Emerson MD  (Tel: None)    Admit date: 7/5/2024    Discharge date   Note Date: 7/6/2024     Reason for Hospitalization.   Chief Complaint   Patient presents with    Chest Pain     Patient arrives through triage with complaints of chest pain. Substernal chest pain that radiates to left side and into the shoulder. Patient denies hx of heart problems. Patient reports nausea and feeling unsteady on his feet as well. Unsteadiness has been ongoing for weeks.          Significant Findings.   Principal Problem:    Chest pain  Resolved Problems:    * No resolved hospital problems. *       Problems and results from this hospitalization that need follow up.  Follow up with cardiology, EP Dr Franco    Needs outpatient echo     Significant test results and incidental findings.   Stress test:        Stress Combined Conclusion: Normal pharmacological myocardial perfusion study. Findings suggest a low risk of cardiac events.    Perfusion Comments: LV perfusion is normal.    Perfusion Conclusion: There is no evidence of transient ischemic dilation (TID). TID ratio is 1.02.    Image quality is good.    ECG: The stress ECG was negative for ischemia.    Stress Test: A pharmacological stress test was performed using regadenoson (Lexiscan). PO caffeine given as a reversal agent. The patient reported 3/10 chest pain, dyspnea, headaches and abdominal cramping during the stress test. Chest pain was characterized as pressure-like. Symptoms began during stress and ended during recovery.     Chest xray:  No acute abnormality       Invasive procedures and treatments.   None     Problem-based Hospital Course.  The patient sought care in the ED due to left sided chest pain which has been  LETTY Chapman - CNP     7/6/2024 12:14 PM

## 2024-07-06 NOTE — PLAN OF CARE
Problem: Discharge Planning  Goal: Discharge to home or other facility with appropriate resources  Outcome: Progressing  Flowsheets (Taken 7/5/2024 1630 by Patsy Carlson, RN)  Discharge to home or other facility with appropriate resources: Identify barriers to discharge with patient and caregiver     Problem: Pain  Goal: Verbalizes/displays adequate comfort level or baseline comfort level  Outcome: Progressing  Flowsheets (Taken 7/5/2024 2015)  Verbalizes/displays adequate comfort level or baseline comfort level:   Encourage patient to monitor pain and request assistance   Assess pain using appropriate pain scale   Administer analgesics based on type and severity of pain and evaluate response   Implement non-pharmacological measures as appropriate and evaluate response   Consider cultural and social influences on pain and pain management   Notify Licensed Independent Practitioner if interventions unsuccessful or patient reports new pain     Problem: Safety - Adult  Goal: Free from fall injury  Outcome: Progressing

## 2024-07-06 NOTE — PLAN OF CARE
Problem: Discharge Planning  Goal: Discharge to home or other facility with appropriate resources  7/6/2024 1418 by Arianna Tyson, RN  Outcome: Adequate for Discharge     Problem: Pain  Goal: Verbalizes/displays adequate comfort level or baseline comfort level  7/6/2024 1418 by Arianna Tyson, RN  Outcome: Adequate for Discharge     Problem: Safety - Adult  Goal: Free from fall injury  7/6/2024 1418 by Arianna Tyson, RN  Outcome: Adequate for Discharge

## 2024-07-06 NOTE — PROGRESS NOTES
Pt slept most of this shift. Initially requested a sleep aide, had a one time melatonin dose ordered but pt refused when awake for vitals and assessments, was able to sleep without difficulty.  NPO since midnight for ECHO with TTE.  No s/s of distress noted, call light within reach.